# Patient Record
Sex: FEMALE | Race: OTHER | NOT HISPANIC OR LATINO | URBAN - METROPOLITAN AREA
[De-identification: names, ages, dates, MRNs, and addresses within clinical notes are randomized per-mention and may not be internally consistent; named-entity substitution may affect disease eponyms.]

---
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
## 2021-02-12 NOTE — ED PEDIATRIC NURSE NOTE - NS ED NURSE RECORD ANOTHER HT AND WT
Urine less than 10,000 colonies,  Need > 100,00 to be a uti.   If still with issues may need to see urology for a cystoscopy to look at bladder Yes

## 2022-07-29 NOTE — ED PROVIDER NOTE - ENMT, MLM
TC:  Calling with right breast pain, new onset this afternoon around 1300.  Has body aches/chills.   Pain is better after feeding.  Took Ibuprofen recently.  Discussed new research based guidelines for mastitis encourage continued NSAIDs like ibuprofen, cold compresses, and complete emptying of the breast. Discussed possible causes of mastitis such as oversupply, deep breast massage, incomplete emptying, stress, dehydration.  Encouraged gentle breast stroking as desired.  Discussed importance of continued breast feeding on right side as the infant empties the breast more effectively than the pump, and as continued flow of milk is important to prevent breast abscess.  Discussed echinacea and adequate sleep can help boost our immune system, but do not recommend echinacea long term.  Encouraged to call back if symptoms do not start improving in 24-48 hours, or if continuing to worsen despite comfort measures, and would consider antibiotic use at that time.  Verbalized understanding and will reach out further as needed.      GAURAV Hartmann CNM [facility]  7/28/2022 7:19 PM   
Airway patent, Nasal mucosa clear. Mouth with normal mucosa. Throat has no vesicles, no oropharyngeal exudates and uvula is midline.

## 2024-05-11 NOTE — ED PROVIDER NOTE - TIMING
sudden onset
Apixaban/Eliquis is used to treat and prevent blood clots. If you are not able to swallow the tablets whole, they may be crushed and mixed in water, apple juice, or applesauce and promptly taken within four hours. Never skip a dose of Apixaban/Eliquis. If you forget to take your Apixaban/Eliquis, take a dose as soon as you remember. If it is almost time for your next Apixaban/Eliquis dose, wait until then and take a regular dose. DO NOT take an extra pill to ‘catch up’.  NEVER TAKE A DOUBLE DOSE. Notify your doctor that you missed a dose. Take Apixaban/Eliquis at the same time each morning and evening. Apixaban/Eliquis may be taken with other medication or food.